# Patient Record
Sex: FEMALE | Race: WHITE | HISPANIC OR LATINO
[De-identification: names, ages, dates, MRNs, and addresses within clinical notes are randomized per-mention and may not be internally consistent; named-entity substitution may affect disease eponyms.]

---

## 2020-06-01 ENCOUNTER — RX ONLY (OUTPATIENT)
Age: 25
Setting detail: RX ONLY
End: 2020-06-01

## 2024-06-11 ENCOUNTER — APPOINTMENT (RX ONLY)
Dept: URBAN - METROPOLITAN AREA CLINIC 144 | Facility: CLINIC | Age: 29
Setting detail: DERMATOLOGY
End: 2024-06-11

## 2024-06-11 PROBLEM — D23.39 OTHER BENIGN NEOPLASM OF SKIN OF OTHER PARTS OF FACE: Status: ACTIVE | Noted: 2024-06-11

## 2024-06-11 PROCEDURE — 99202 OFFICE O/P NEW SF 15 MIN: CPT

## 2024-06-11 PROCEDURE — ? TREATMENT REGIMEN

## 2024-06-11 PROCEDURE — ? COUNSELING

## 2024-06-11 NOTE — PROCEDURE: TREATMENT REGIMEN
Plan: Recommend pt to see Sarah for a consultation for possible CO2 laser tx or other options
Detail Level: Zone

## 2024-06-11 NOTE — PROCEDURE: COUNSELING
Detail Level: Detailed
Patient Specific Counseling (Will Not Stick From Patient To Patient): Discussed, lesions are clinically consistent with syringomas. Definitive diagnosis possible through biopsy, but would leave scar, and we agreed to hold off at this time.\\nDiscussed, there is not a cream that would remove the lesions. Procedural treatments could be considered for cosmetic reasons if desired. We agreed for patient to see Sarah to discuss options.

## 2024-06-18 ENCOUNTER — APPOINTMENT (RX ONLY)
Dept: URBAN - METROPOLITAN AREA CLINIC 141 | Facility: CLINIC | Age: 29
Setting detail: DERMATOLOGY
End: 2024-06-18

## 2024-06-18 DIAGNOSIS — Z41.9 ENCOUNTER FOR PROCEDURE FOR PURPOSES OTHER THAN REMEDYING HEALTH STATE, UNSPECIFIED: ICD-10-CM

## 2024-06-18 PROCEDURE — ? ADDITIONAL NOTES

## 2024-06-18 PROCEDURE — ? INVENTORY

## 2024-06-18 PROCEDURE — ? COSMETIC CONSULTATION: FRACTIONAL RESURFACING

## 2024-06-18 ASSESSMENT — LOCATION SIMPLE DESCRIPTION DERM
LOCATION SIMPLE: LEFT CHEEK
LOCATION SIMPLE: RIGHT CHEEK

## 2024-06-18 ASSESSMENT — LOCATION ZONE DERM: LOCATION ZONE: FACE

## 2024-06-18 ASSESSMENT — LOCATION DETAILED DESCRIPTION DERM
LOCATION DETAILED: RIGHT MEDIAL MALAR CHEEK
LOCATION DETAILED: LEFT MEDIAL MALAR CHEEK

## 2024-06-18 NOTE — PROCEDURE: ADDITIONAL NOTES
Additional Notes: Patient was quoted for CO2 with  and he quoted from the picture $400.00 for the treatment. \\nPatient was told to arrive one hour prior to treatment.\\n\\nNo charge for today’s consultation.
Detail Level: Simple
Render Risk Assessment In Note?: no

## 2024-06-18 NOTE — HPI: OTHER
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
Please Describe Your Condition:: Patient is interested in laser treatment

## 2024-10-03 ENCOUNTER — APPOINTMENT (RX ONLY)
Dept: URBAN - METROPOLITAN AREA CLINIC 141 | Facility: CLINIC | Age: 29
Setting detail: DERMATOLOGY
End: 2024-10-03

## 2024-10-03 PROBLEM — D23.39 OTHER BENIGN NEOPLASM OF SKIN OF OTHER PARTS OF FACE: Status: ACTIVE | Noted: 2024-10-03

## 2024-10-03 PROCEDURE — ? INVENTORY

## 2024-10-03 PROCEDURE — ? DESTRUCTION: CO2 LASER

## 2024-10-03 NOTE — PROCEDURE: DESTRUCTION: CO2 LASER
Topical Anesthesia?: 23% lidocaine, 7% tetracaine
Post-Care Instructions: I reviewed with the patient in detail post-care instructions. Patient should avoid sun until the area is fully healed. Pt should apply vaseline to treated areas.
External Cooling Fan Speed: 5
Post-Care Statement: Following the procedure, vaseline and a bandage were applied.
Pulse Duration (Usec): 0
Was Feathering Performed?: Yes
Laser Mode: Fractionated
Laser Type: [place] 50 CO2 Laser
Energy(Mj-Leave At Zero If Unwanted): 500
Feathering Statement: Following the treatment the wound edges were feathered using 260mJ.
Anesthesia Volume In Cc: 16
Power ([place]-Leave At Zero If Unwanted): 40
Anesthesia Type: 0.5% lidocaine with 1:200,000 epinephrine
Wavelength: 10,600nm
Treatment Number: 1
Spot Sizes: 3mm
Passes: several
Consent: Written consent obtained, risks reviewed including but not limited to crusting, scabbing, blistering, scarring, darker or lighter pigmentary change, incomplete improvement, infection and bleeding.
Detail Level: Detailed